# Patient Record
Sex: MALE | URBAN - METROPOLITAN AREA
[De-identification: names, ages, dates, MRNs, and addresses within clinical notes are randomized per-mention and may not be internally consistent; named-entity substitution may affect disease eponyms.]

---

## 2024-06-17 ENCOUNTER — TELEPHONE (OUTPATIENT)
Age: 84
End: 2024-06-17

## 2024-06-17 NOTE — TELEPHONE ENCOUNTER
Patient's daughter states she is returning a call from our office to have her father scheduled as a new patient.    She states she was told he could be worked in.    She believes it was the nurse who called her.